# Patient Record
Sex: MALE | Race: WHITE | NOT HISPANIC OR LATINO | URBAN - NONMETROPOLITAN AREA
[De-identification: names, ages, dates, MRNs, and addresses within clinical notes are randomized per-mention and may not be internally consistent; named-entity substitution may affect disease eponyms.]

---

## 2019-07-09 ENCOUNTER — IMPORTED ENCOUNTER (OUTPATIENT)
Dept: URBAN - NONMETROPOLITAN AREA CLINIC 1 | Facility: CLINIC | Age: 34
End: 2019-07-09

## 2019-07-09 PROBLEM — H52.13: Noted: 2019-07-09

## 2019-07-09 PROCEDURE — 92004 COMPRE OPH EXAM NEW PT 1/>: CPT

## 2019-07-09 PROCEDURE — 92015 DETERMINE REFRACTIVE STATE: CPT

## 2021-06-18 ENCOUNTER — IMPORTED ENCOUNTER (OUTPATIENT)
Dept: URBAN - NONMETROPOLITAN AREA CLINIC 1 | Facility: CLINIC | Age: 36
End: 2021-06-18

## 2021-06-18 PROCEDURE — 92015 DETERMINE REFRACTIVE STATE: CPT

## 2021-06-18 PROCEDURE — 92014 COMPRE OPH EXAM EST PT 1/>: CPT

## 2021-06-18 NOTE — PATIENT DISCUSSION
Myopia-Discussed diagnosis with patient. -Explained that people who are myopic are at a higher risk for developing RD/RT and reviewed associated S&S.-Pt to contact our office if symptoms develop. Updated spec Rx given.   Recommend lens that will provide comfort as well as protect safety and health of eyes.; 's Notes: PCP: [id number] 6/18/2021DFE 6/18/2021

## 2022-04-10 ASSESSMENT — VISUAL ACUITY
OS_CC: J1+
OS_SC: 20/20
OD_SC: 20/20
OU_CC: 20/20
OD_SC: 20/20
OU_SC: 20/20
OS_SC: 20/20
OD_CC: J1+

## 2022-04-10 ASSESSMENT — TONOMETRY
OS_IOP_MMHG: 15
OD_IOP_MMHG: 15
OS_IOP_MMHG: 16
OD_IOP_MMHG: 16

## 2023-12-11 ENCOUNTER — EMERGENCY VISIT (OUTPATIENT)
Dept: RURAL CLINIC 1 | Facility: CLINIC | Age: 38
End: 2023-12-11

## 2023-12-11 DIAGNOSIS — H00.031: ICD-10-CM

## 2023-12-11 PROCEDURE — 99213 OFFICE O/P EST LOW 20 MIN: CPT

## 2024-01-16 ENCOUNTER — ESTABLISHED PATIENT (OUTPATIENT)
Dept: RURAL CLINIC 1 | Facility: CLINIC | Age: 39
End: 2024-01-16

## 2024-01-16 DIAGNOSIS — H52.13: ICD-10-CM

## 2024-01-16 PROCEDURE — 92015 DETERMINE REFRACTIVE STATE: CPT

## 2024-01-16 PROCEDURE — 92014 COMPRE OPH EXAM EST PT 1/>: CPT

## 2024-01-16 ASSESSMENT — TONOMETRY
OD_IOP_MMHG: 14
OS_IOP_MMHG: 14

## 2024-01-16 ASSESSMENT — VISUAL ACUITY
OS_CC: 20/20
OD_CC: 20/20